# Patient Record
Sex: MALE | Race: BLACK OR AFRICAN AMERICAN | Employment: STUDENT | URBAN - METROPOLITAN AREA
[De-identification: names, ages, dates, MRNs, and addresses within clinical notes are randomized per-mention and may not be internally consistent; named-entity substitution may affect disease eponyms.]

---
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
## 2019-03-21 ENCOUNTER — HOSPITAL ENCOUNTER (EMERGENCY)
Facility: CLINIC | Age: 19
Discharge: HOME | End: 2019-03-21
Attending: EMERGENCY MEDICINE | Admitting: EMERGENCY MEDICINE
Payer: COMMERCIAL

## 2019-03-21 ENCOUNTER — APPOINTMENT (OUTPATIENT)
Dept: GENERAL RADIOLOGY | Facility: CLINIC | Age: 19
End: 2019-03-21
Attending: EMERGENCY MEDICINE
Payer: COMMERCIAL

## 2019-03-21 VITALS
DIASTOLIC BLOOD PRESSURE: 82 MMHG | TEMPERATURE: 98 F | HEART RATE: 110 BPM | OXYGEN SATURATION: 99 % | WEIGHT: 190 LBS | SYSTOLIC BLOOD PRESSURE: 134 MMHG | HEIGHT: 70 IN | RESPIRATION RATE: 20 BRPM | BODY MASS INDEX: 27.2 KG/M2

## 2019-03-21 DIAGNOSIS — J06.9 VIRAL URI WITH COUGH: ICD-10-CM

## 2019-03-21 LAB
DEPRECATED S PYO AG THROAT QL EIA: NORMAL
FLUAV+FLUBV AG SPEC QL: NEGATIVE
FLUAV+FLUBV AG SPEC QL: NEGATIVE
SPECIMEN SOURCE: NORMAL
SPECIMEN SOURCE: NORMAL

## 2019-03-21 PROCEDURE — 87804 INFLUENZA ASSAY W/OPTIC: CPT | Performed by: EMERGENCY MEDICINE

## 2019-03-21 PROCEDURE — 99284 EMERGENCY DEPT VISIT MOD MDM: CPT | Mod: 25

## 2019-03-21 PROCEDURE — 87081 CULTURE SCREEN ONLY: CPT | Performed by: EMERGENCY MEDICINE

## 2019-03-21 PROCEDURE — 25000132 ZZH RX MED GY IP 250 OP 250 PS 637: Performed by: EMERGENCY MEDICINE

## 2019-03-21 PROCEDURE — 87880 STREP A ASSAY W/OPTIC: CPT | Performed by: EMERGENCY MEDICINE

## 2019-03-21 PROCEDURE — 71046 X-RAY EXAM CHEST 2 VIEWS: CPT

## 2019-03-21 RX ORDER — IBUPROFEN 600 MG/1
600 TABLET, FILM COATED ORAL ONCE
Status: COMPLETED | OUTPATIENT
Start: 2019-03-21 | End: 2019-03-21

## 2019-03-21 RX ORDER — LORATADINE 10 MG/1
10 TABLET ORAL DAILY PRN
Qty: 30 TABLET | Refills: 0 | Status: SHIPPED | OUTPATIENT
Start: 2019-03-21

## 2019-03-21 RX ORDER — IBUPROFEN 200 MG
600 TABLET ORAL EVERY 6 HOURS PRN
Qty: 60 TABLET | Refills: 0 | Status: SHIPPED | OUTPATIENT
Start: 2019-03-21

## 2019-03-21 RX ORDER — ALBUTEROL SULFATE 90 UG/1
1-2 AEROSOL, METERED RESPIRATORY (INHALATION) EVERY 6 HOURS PRN
Qty: 18 G | Refills: 1 | Status: SHIPPED | OUTPATIENT
Start: 2019-03-21

## 2019-03-21 RX ADMIN — IBUPROFEN 600 MG: 600 TABLET ORAL at 20:32

## 2019-03-21 SDOH — HEALTH STABILITY: MENTAL HEALTH: HOW OFTEN DO YOU HAVE A DRINK CONTAINING ALCOHOL?: NEVER

## 2019-03-21 ASSESSMENT — ENCOUNTER SYMPTOMS
COUGH: 1
SORE THROAT: 1
FEVER: 1
SHORTNESS OF BREATH: 1
RHINORRHEA: 0

## 2019-03-21 ASSESSMENT — MIFFLIN-ST. JEOR: SCORE: 1883.08

## 2019-03-21 NOTE — ED AVS SNAPSHOT
[facility] Emergency Department  [address]  [facility] [address]  Phone:  [phone]  Fax:  [phone]                                    Juan Barrios   MRN: [id number]    Department:  [facility] Emergency Department   Date of Visit:  3/21/2019           After Visit Summary Signature Page    I have received my discharge instructions, and my questions have been answered. I have discussed any challenges I see with this plan with the nurse or doctor.    ..........................................................................................................................................  Patient/Patient Representative Signature      ..........................................................................................................................................  Patient Representative Print Name and Relationship to Patient    ..................................................               ................................................  Date                                   Time    ..........................................................................................................................................  Reviewed by Signature/Title    ...................................................              ..............................................  Date                                               Time          22EPIC Rev 08/18

## 2019-03-22 NOTE — ED TRIAGE NOTES
Pt arrives to the ED for shortness of breath that began 2-3 days ago. Pt states that it feels hard to take a deep breath. Pt has h/o of asthma but has not been using any inhalers or breathing treatments at home. Pt states having low grade fevers at home, no ibuprofen or tylenol today. Pt also states cough with the SOB.

## 2019-03-22 NOTE — ED NOTES
Pt provided with discharge paperwork and educated on recommended follow-up with PCP. Pt educated on how to manage symptoms at home and how to take prescription medications. Pt voiced understanding and denied any questions at discharge.

## 2019-03-22 NOTE — ED PROVIDER NOTES
"  History     Chief Complaint:  Shortness of breath     HPI   Juan Barrios is a 18 year old male, with a history of asthma, who presents with his mother to the emergency department for evaluation of shortness of breath. The patient reports he started experiencing shortness of breath of approximately three days prior to evaluation, along with a productive cough and sore throat. He reports it is difficult to breath in. He denies any ear pain, congestion, rhinorrhea, or chest pain. He notes a low fever here, but is unsure whether he has had a fever the past few days. He notes he does have a history of asthma, but has not had problems or needed to use his inhaler recently.    Allergies:  No known drug allergies     Medications:    Epinephrine  Omeprazole    Past Medical History:    Seasonal allergic rhinitis  Asthma    Past Surgical History:    History reviewed. No pertinent surgical history.    Family History:    History reviewed. No pertinent family history.     Social History:  Smoking status: Never  Alcohol use: No  The patient presents to the emergency department with his mother.  Marital Status:  Single [1]     Review of Systems   Constitutional: Positive for fever.   HENT: Positive for sore throat. Negative for congestion, ear pain and rhinorrhea.    Respiratory: Positive for cough and shortness of breath.    Cardiovascular: Negative for chest pain.   All other systems reviewed and are negative.    Physical Exam   Patient Vitals for the past 24 hrs:   BP Temp Temp src Pulse Heart Rate Resp SpO2 Height Weight   03/21/19 2149 -- 98  F (36.7  C) Oral -- -- -- -- -- --   03/21/19 2045 -- -- -- -- -- -- 99 % -- --   03/21/19 2030 -- -- -- -- -- -- 99 % -- --   03/21/19 2020 -- -- -- -- 96 -- -- -- --   03/21/19 2011 134/82 100.8  F (38.2  C) Oral 110 110 20 99 % 1.778 m (5' 10\") 86.2 kg (190 lb)     Physical Exam  Constitutional: Vital signs reviewed as above.  HEENT:               Head: No external signs of " trauma. No lesions noted.              Eyes: PEERL, EOMI B/L, no pain or limitation of superior gaze              Ears: Normal B/L TM and external canals              Nose: Noncongested, no exudates. No rhinorrhea. No FB noted              Mouth/Throat:                           Mucous membranes are moist and normal.                           No Oropharyngeal exudate. Mildly erythematous pharynx noted.                          No tonsilar swelling noted.                           No uvular deviation noted.                          No swelling noted on the floor of the mouth  Neck: FROM. Neck is supple  Cardiovascular: Normal rate on my exam, regular rhythm and normal heart sounds.  No murmur heard. Equal B/L peripheral pulses.  Pulmonary/Chest: Effort normal and breath sounds normal. No respiratory distress. Patient has no wheezes. Patient has faint crackles B/L.   Gastrointestinal: Soft. There is no tenderness.   Musculoskeletal/Extremities: No edema noted. Normal tone.  Neurological: Patient is alert and oriented to person, place, and time.   Skin: Skin is warm and dry. There is no diaphoresis noted.   Psychiatric: The patient appears calm.  Emergency Department Course   Imaging:  Radiographic findings were communicated with the patient and family who voiced understanding of the findings.    Chest XR, PA & LAT  IMPRESSION: Clear lungs.  As read by Radiology.    Laboratory:  Rapid strep screen: Negative  Beta strep group A culture: pending  Influenza A/B antigen: Negative    Interventions:  2032 Ibuprofen 600 mg PO    Emergency Department Course:  Past medical records, nursing notes, and vitals reviewed.  2014: I performed an exam of the patient and obtained history, as documented above.    I obtained an influenza and strep screen.    The patient was sent for a chest x-ray while in the emergency department, findings above.     2134: I rechecked the patient. Findings and plan explained to the Patient and mother.  Patient discharged home with instructions regarding supportive care, medications, and reasons to return. The importance of close follow-up was reviewed.   Impression & Plan    Medical Decision Making:  This 18-year-old male patient presents the ED due to upper respiratory symptoms.  Please see the HPI and exam for specifics.  Patient's diagnostic testing was negative for worrisome acute infectious etiologies in the ED.  I do believe he has a viral upper respiratory infection.  He notes that he does have a history of asthma and has been out of his inhaler.  I did not hear any wheezing today but I will prescribe this for symptom medic treatment for home as well as Claritin.  I have encouraged him to use ibuprofen and Tylenol to manage his fever and aches and encouraged him to stay hydrated as well.  I counseled him on the likely symptom course.  Anticipatory guidance given prior to discharge.    Diagnosis:    ICD-10-CM   1. Viral URI with cough J06.9    B97.89     Disposition:  Discharged to home.  Discharge Medications:  Started    albuterol 108 (90 Base) MCG/ACT inhaler  Commonly known as:  PROAIR HFA/PROVENTIL HFA/VENTOLIN HFA  1-2 puffs, Inhalation, EVERY 6 HOURS PRN     ibuprofen 200 MG tablet  Commonly known as:  ADVIL/MOTRIN  600 mg, Oral, EVERY 6 HOURS PRN     loratadine 10 MG tablet  Commonly known as:  CLARITIN  10 mg, Oral, DAILY PRN       Maggie Higgins  3/21/2019   [facility] EMERGENCY DEPARTMENT  Scribe Disclosure:  Maggie NEVES, am serving as a scribe at 8:14 PM on 3/21/2019 to document services personally performed by Stiven Spear DO based on my observations and the provider's statements to me.      Stiven Spear DO  03/22/19 0011

## 2019-03-23 LAB
BACTERIA SPEC CULT: NORMAL
Lab: NORMAL
SPECIMEN SOURCE: NORMAL

## 2019-11-04 DIAGNOSIS — Z31.41 ENCOUNTER FOR SPERM COUNT FOR FERTILITY TESTING: Primary | ICD-10-CM

## 2019-11-06 DIAGNOSIS — Z31.41 ENCOUNTER FOR SPERM COUNT FOR FERTILITY TESTING: Primary | ICD-10-CM

## 2019-11-06 LAB
ABSTINENCE DAYS: 20 DAYS (ref 2–7)
AGGLUTINATION: ABNORMAL YES/NO
ANALYSIS TEMP - CENTIGRADE: 22 CENTIGRADE
CELL FRAGMENTS: ABNORMAL %
COLLECTION METHOD: ABNORMAL
COLLECTION SITE: ABNORMAL
CONSENT TO RELEASE TO PARTNER: NO
IMMATURE SPERM: ABNORMAL %
IMMOTILE: 0 %
LAB RECEIPT TIME: ABNORMAL
LIQUEFIED: YES YES/NO
NON-PROGRESSIVE MOTILITY: 0 %
PROGRESSIVE MOTILITY: 0 % (ref 32–?)
ROUND CELLS: 0.1 MILLION/ML (ref ?–2)
SPECIMEN CONCENTRATION: 0 MILLION/ML (ref 15–?)
SPECIMEN PH: 7.2 PH (ref 7.2–?)
SPECIMEN TYPE: ABNORMAL
SPECIMEN VOL UR: 3 ML (ref 1.5–?)
TIME OF ANALYSIS: ABNORMAL
TOTAL NUMBER: 0 MILLION (ref 39–?)
TOTAL PROGRESSIVE MOTILE: 0 MILLION (ref 15.6–?)
VISCOUS: NO YES/NO
WBC SPECIMEN: ABNORMAL %

## 2019-11-06 PROCEDURE — 89260 SPERM ISOLATION SIMPLE: CPT

## 2019-11-13 ENCOUNTER — MEDICAL CORRESPONDENCE (OUTPATIENT)
Dept: HEALTH INFORMATION MANAGEMENT | Facility: CLINIC | Age: 19
End: 2019-11-13

## 2020-01-15 PROBLEM — Q98.0 KLINEFELTER SYNDROME KARYOTYPE 47, XXY: Status: ACTIVE | Noted: 2019-10-28
